# Patient Record
Sex: FEMALE | Race: WHITE
[De-identification: names, ages, dates, MRNs, and addresses within clinical notes are randomized per-mention and may not be internally consistent; named-entity substitution may affect disease eponyms.]

---

## 2017-04-20 ENCOUNTER — HOSPITAL ENCOUNTER (EMERGENCY)
Dept: HOSPITAL 4 - SED | Age: 22
Discharge: HOME | End: 2017-04-20
Payer: MEDICAID

## 2017-04-20 VITALS — SYSTOLIC BLOOD PRESSURE: 151 MMHG

## 2017-04-20 VITALS — WEIGHT: 105 LBS | HEIGHT: 66 IN | BODY MASS INDEX: 16.88 KG/M2

## 2017-04-20 VITALS — SYSTOLIC BLOOD PRESSURE: 132 MMHG

## 2017-04-20 DIAGNOSIS — H61.22: Primary | ICD-10-CM

## 2017-04-20 DIAGNOSIS — R03.0: ICD-10-CM

## 2017-04-20 DIAGNOSIS — H60.92: ICD-10-CM

## 2017-04-20 DIAGNOSIS — F31.9: ICD-10-CM

## 2017-05-03 ENCOUNTER — HOSPITAL ENCOUNTER (EMERGENCY)
Dept: HOSPITAL 4 - SED | Age: 22
Discharge: HOME | End: 2017-05-03
Payer: MEDICAID

## 2017-05-03 VITALS — BODY MASS INDEX: 16.88 KG/M2 | SYSTOLIC BLOOD PRESSURE: 126 MMHG | HEIGHT: 66 IN | WEIGHT: 105 LBS

## 2017-05-03 VITALS — SYSTOLIC BLOOD PRESSURE: 126 MMHG

## 2017-05-03 DIAGNOSIS — F31.9: ICD-10-CM

## 2017-05-03 DIAGNOSIS — B35.2: Primary | ICD-10-CM

## 2017-10-07 ENCOUNTER — HOSPITAL ENCOUNTER (EMERGENCY)
Dept: HOSPITAL 4 - SED | Age: 22
Discharge: HOME | End: 2017-10-07
Payer: MEDICAID

## 2017-10-07 VITALS — HEIGHT: 66 IN | WEIGHT: 110 LBS | BODY MASS INDEX: 17.68 KG/M2

## 2017-10-07 VITALS
HEART RATE: 79 BPM | DIASTOLIC BLOOD PRESSURE: 74 MMHG | SYSTOLIC BLOOD PRESSURE: 113 MMHG | RESPIRATION RATE: 16 BRPM | OXYGEN SATURATION: 98 % | TEMPERATURE: 96.8 F

## 2017-10-07 VITALS
OXYGEN SATURATION: 99 % | DIASTOLIC BLOOD PRESSURE: 71 MMHG | HEART RATE: 84 BPM | TEMPERATURE: 98.4 F | SYSTOLIC BLOOD PRESSURE: 128 MMHG | RESPIRATION RATE: 20 BRPM
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
## 2017-10-07 DIAGNOSIS — F31.9: ICD-10-CM
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
## 2017-10-07 DIAGNOSIS — S46.811A: Primary | ICD-10-CM
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
## 2017-10-07 LAB — HCG UR QL: NEGATIVE

## 2017-10-07 NOTE — NUR
Pt states she is having right postieror shoulder pain for 1 wk. She does not 
remember injuring her right shoulder.

## 2017-10-07 NOTE — NUR
Patient given written and verbal discharge instructions and verbalizes 
understanding.  ER MD discussed with patient the results and treatment 
provided. Patient in stable condition. ID arm band removed. Patient educated on 
pain management and to follow up with PMD. Pain Scale 7/10. Opportunity for 
questions provided and answered.

## 2017-12-27 ENCOUNTER — HOSPITAL ENCOUNTER (EMERGENCY)
Dept: HOSPITAL 4 - SED | Age: 22
Discharge: HOME | End: 2017-12-27
Payer: MEDICAID

## 2017-12-27 VITALS — BODY MASS INDEX: 17.68 KG/M2 | HEIGHT: 66 IN | WEIGHT: 110 LBS

## 2017-12-27 VITALS — SYSTOLIC BLOOD PRESSURE: 116 MMHG

## 2017-12-27 VITALS — SYSTOLIC BLOOD PRESSURE: 115 MMHG

## 2017-12-27 DIAGNOSIS — N83.209: Primary | ICD-10-CM

## 2017-12-27 DIAGNOSIS — F31.9: ICD-10-CM

## 2017-12-27 LAB
ALBUMIN SERPL BCP-MCNC: 3.8 G/DL (ref 3.4–4.8)
ALT SERPL W P-5'-P-CCNC: 63 U/L (ref 12–78)
ANION GAP SERPL CALCULATED.3IONS-SCNC: 8 MMOL/L (ref 5–15)
APPEARANCE UR: (no result)
AST SERPL W P-5'-P-CCNC: 46 U/L (ref 10–37)
BASOPHILS # BLD AUTO: 0 K/UL (ref 0–0.2)
BASOPHILS NFR BLD AUTO: 0.8 % (ref 0–2)
BILIRUB SERPL-MCNC: 0.3 MG/DL (ref 0–1)
BILIRUB UR QL STRIP: NEGATIVE
BUN SERPL-MCNC: 9 MG/DL (ref 8–21)
CALCIUM SERPL-MCNC: 9.1 MG/DL (ref 8.4–11)
CHLORIDE SERPL-SCNC: 103 MMOL/L (ref 98–107)
COLOR UR: YELLOW
CREAT SERPL-MCNC: 0.66 MG/DL (ref 0.55–1.3)
EOSINOPHIL # BLD AUTO: 0.1 K/UL (ref 0–0.4)
EOSINOPHIL NFR BLD AUTO: 2.4 % (ref 0–4)
ERYTHROCYTE [DISTWIDTH] IN BLOOD BY AUTOMATED COUNT: 12.9 % (ref 9–15)
GFR SERPL CREATININE-BSD FRML MDRD: 142 ML/MIN (ref 90–?)
GLUCOSE SERPL-MCNC: 90 MG/DL (ref 70–99)
GLUCOSE UR STRIP-MCNC: NEGATIVE MG/DL
HCT VFR BLD AUTO: 35.4 % (ref 36–48)
HGB BLD-MCNC: 11.8 G/DL (ref 12–16)
HGB UR QL STRIP: NEGATIVE
KETONES UR STRIP-MCNC: NEGATIVE MG/DL
LEUKOCYTE ESTERASE UR QL STRIP: NEGATIVE
LIPASE SERPL-CCNC: 148 U/L (ref 73–393)
LYMPHOCYTES # BLD AUTO: 1.6 K/UL (ref 1–5.5)
LYMPHOCYTES NFR BLD AUTO: 39.8 % (ref 20.5–51.5)
MCH RBC QN AUTO: 31 PG (ref 27–31)
MCHC RBC AUTO-ENTMCNC: 33 % (ref 32–36)
MCV RBC AUTO: 92 FL (ref 79–98)
MONOCYTES # BLD MANUAL: 0.5 K/UL (ref 0–1)
MONOCYTES # BLD MANUAL: 12.2 % (ref 1.7–9.3)
NEUTROPHILS # BLD AUTO: 1.9 K/UL (ref 1.8–7.7)
NEUTROPHILS NFR BLD AUTO: 44.8 % (ref 40–70)
NITRITE UR QL STRIP: NEGATIVE
PH UR STRIP: 7 [PH] (ref 5–8)
PLATELET # BLD AUTO: 142 K/UL (ref 130–430)
POTASSIUM SERPL-SCNC: 4 MMOL/L (ref 3.5–5.1)
PROT UR QL STRIP: NEGATIVE
RBC # BLD AUTO: 3.85 MIL/UL (ref 4.2–6.2)
SODIUM SERPLBLD-SCNC: 136 MMOL/L (ref 136–145)
SP GR UR STRIP: 1.01 (ref 1–1.03)
UROBILINOGEN UR STRIP-MCNC: 0.2 MG/DL (ref 0.2–1)
WBC # BLD AUTO: 4.1 K/UL (ref 4.8–10.8)

## 2017-12-27 PROCEDURE — 99285 EMERGENCY DEPT VISIT HI MDM: CPT

## 2017-12-27 PROCEDURE — 96375 TX/PRO/DX INJ NEW DRUG ADDON: CPT

## 2017-12-27 PROCEDURE — 96361 HYDRATE IV INFUSION ADD-ON: CPT

## 2017-12-27 PROCEDURE — 80053 COMPREHEN METABOLIC PANEL: CPT

## 2017-12-27 PROCEDURE — 83690 ASSAY OF LIPASE: CPT

## 2017-12-27 PROCEDURE — 81003 URINALYSIS AUTO W/O SCOPE: CPT

## 2017-12-27 PROCEDURE — 36415 COLL VENOUS BLD VENIPUNCTURE: CPT

## 2017-12-27 PROCEDURE — 85025 COMPLETE CBC W/AUTO DIFF WBC: CPT

## 2017-12-27 PROCEDURE — 96374 THER/PROPH/DIAG INJ IV PUSH: CPT

## 2017-12-27 PROCEDURE — 81025 URINE PREGNANCY TEST: CPT

## 2017-12-27 PROCEDURE — 74176 CT ABD & PELVIS W/O CONTRAST: CPT

## 2018-07-19 ENCOUNTER — HOSPITAL ENCOUNTER (EMERGENCY)
Dept: HOSPITAL 4 - SED | Age: 23
Discharge: HOME | End: 2018-07-19
Payer: MEDICAID

## 2018-07-19 VITALS — WEIGHT: 120 LBS | BODY MASS INDEX: 19.29 KG/M2 | HEIGHT: 66 IN | SYSTOLIC BLOOD PRESSURE: 123 MMHG

## 2018-07-19 VITALS — SYSTOLIC BLOOD PRESSURE: 122 MMHG

## 2018-07-19 DIAGNOSIS — F31.9: ICD-10-CM

## 2018-07-19 DIAGNOSIS — N83.01: ICD-10-CM

## 2018-07-19 DIAGNOSIS — R03.0: ICD-10-CM

## 2018-07-19 DIAGNOSIS — Z79.899: ICD-10-CM

## 2018-07-19 DIAGNOSIS — R10.32: ICD-10-CM

## 2018-07-19 DIAGNOSIS — N83.02: Primary | ICD-10-CM

## 2018-07-19 LAB
ALBUMIN SERPL BCP-MCNC: 3.8 G/DL (ref 3.4–4.8)
ALT SERPL W P-5'-P-CCNC: 15 U/L (ref 12–78)
ANION GAP SERPL CALCULATED.3IONS-SCNC: 11 MMOL/L (ref 5–15)
APPEARANCE UR: CLEAR
AST SERPL W P-5'-P-CCNC: 13 U/L (ref 10–37)
BASOPHILS # BLD AUTO: 0.1 K/UL (ref 0–0.2)
BASOPHILS NFR BLD AUTO: 1.3 % (ref 0–2)
BILIRUB SERPL-MCNC: 0.7 MG/DL (ref 0–1)
BILIRUB UR QL STRIP: NEGATIVE
BUN SERPL-MCNC: 8 MG/DL (ref 8–21)
CALCIUM SERPL-MCNC: 8.9 MG/DL (ref 8.4–11)
CHLORIDE SERPL-SCNC: 106 MMOL/L (ref 98–107)
COLOR UR: YELLOW
CREAT SERPL-MCNC: 0.84 MG/DL (ref 0.55–1.3)
EOSINOPHIL # BLD AUTO: 0.1 K/UL (ref 0–0.4)
EOSINOPHIL NFR BLD AUTO: 2.7 % (ref 0–4)
ERYTHROCYTE [DISTWIDTH] IN BLOOD BY AUTOMATED COUNT: 12.9 % (ref 9–15)
GFR SERPL CREATININE-BSD FRML MDRD: 106 ML/MIN (ref 90–?)
GLUCOSE SERPL-MCNC: 88 MG/DL (ref 70–99)
GLUCOSE UR STRIP-MCNC: NEGATIVE MG/DL
HCT VFR BLD AUTO: 37.2 % (ref 36–48)
HGB BLD-MCNC: 12.4 G/DL (ref 12–16)
HGB UR QL STRIP: NEGATIVE
KETONES UR STRIP-MCNC: NEGATIVE MG/DL
LEUKOCYTE ESTERASE UR QL STRIP: NEGATIVE
LIPASE SERPL-CCNC: 152 U/L (ref 73–393)
LYMPHOCYTES # BLD AUTO: 1.5 K/UL (ref 1–5.5)
LYMPHOCYTES NFR BLD AUTO: 28 % (ref 20.5–51.5)
MCH RBC QN AUTO: 31 PG (ref 27–31)
MCHC RBC AUTO-ENTMCNC: 33 % (ref 32–36)
MCV RBC AUTO: 92 FL (ref 79–98)
MONOCYTES # BLD MANUAL: 0.5 K/UL (ref 0–1)
MONOCYTES # BLD MANUAL: 9.8 % (ref 1.7–9.3)
NEUTROPHILS # BLD AUTO: 3.2 K/UL (ref 1.8–7.7)
NEUTROPHILS NFR BLD AUTO: 58.2 % (ref 40–70)
NITRITE UR QL STRIP: NEGATIVE
PH UR STRIP: 6 [PH] (ref 5–8)
PLATELET # BLD AUTO: 189 K/UL (ref 130–430)
POTASSIUM SERPL-SCNC: 3.7 MMOL/L (ref 3.5–5.1)
PROT UR QL STRIP: NEGATIVE
RBC # BLD AUTO: 4.06 MIL/UL (ref 4.2–6.2)
SODIUM SERPLBLD-SCNC: 141 MMOL/L (ref 136–145)
SP GR UR STRIP: 1.02 (ref 1–1.03)
UROBILINOGEN UR STRIP-MCNC: 0.2 MG/DL (ref 0.2–1)
WBC # BLD AUTO: 5.4 K/UL (ref 4.8–10.8)

## 2018-11-16 ENCOUNTER — HOSPITAL ENCOUNTER (EMERGENCY)
Dept: HOSPITAL 4 - SED | Age: 23
Discharge: HOME | End: 2018-11-16
Payer: MEDICAID

## 2018-11-16 VITALS — BODY MASS INDEX: 17.68 KG/M2 | WEIGHT: 110 LBS | HEIGHT: 66 IN

## 2018-11-16 VITALS — SYSTOLIC BLOOD PRESSURE: 110 MMHG

## 2018-11-16 VITALS — SYSTOLIC BLOOD PRESSURE: 126 MMHG
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
## 2018-11-16 DIAGNOSIS — S60.222A: Primary | ICD-10-CM

## 2018-11-16 DIAGNOSIS — R03.0: ICD-10-CM
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
## 2018-11-16 DIAGNOSIS — Z79.899: ICD-10-CM
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
## 2018-11-16 PROCEDURE — 99284 EMERGENCY DEPT VISIT MOD MDM: CPT

## 2018-11-16 PROCEDURE — 73130 X-RAY EXAM OF HAND: CPT

## 2018-11-16 PROCEDURE — 96372 THER/PROPH/DIAG INJ SC/IM: CPT

## 2019-02-15 ENCOUNTER — HOSPITAL ENCOUNTER (EMERGENCY)
Dept: HOSPITAL 4 - SED | Age: 24
Discharge: HOME | End: 2019-02-15
Payer: MEDICAID

## 2019-02-15 VITALS — WEIGHT: 110 LBS | HEIGHT: 66 IN | BODY MASS INDEX: 17.68 KG/M2

## 2019-02-15 VITALS — SYSTOLIC BLOOD PRESSURE: 127 MMHG

## 2019-02-15 DIAGNOSIS — M54.2: Primary | ICD-10-CM

## 2019-08-03 ENCOUNTER — HOSPITAL ENCOUNTER (EMERGENCY)
Dept: HOSPITAL 4 - SED | Age: 24
Discharge: HOME | End: 2019-08-03
Payer: MEDICAID

## 2019-08-03 VITALS — HEIGHT: 65 IN | BODY MASS INDEX: 17.83 KG/M2 | WEIGHT: 107 LBS

## 2019-08-03 VITALS — SYSTOLIC BLOOD PRESSURE: 123 MMHG

## 2019-08-03 VITALS — SYSTOLIC BLOOD PRESSURE: 141 MMHG

## 2019-08-03 DIAGNOSIS — M77.8: Primary | ICD-10-CM

## 2019-08-03 DIAGNOSIS — F31.9: ICD-10-CM

## 2019-08-03 LAB
ALBUMIN SERPL BCP-MCNC: 4.2 G/DL (ref 3.4–4.8)
ALT SERPL W P-5'-P-CCNC: 12 U/L (ref 12–78)
ANION GAP SERPL CALCULATED.3IONS-SCNC: 7 MMOL/L (ref 5–15)
AST SERPL W P-5'-P-CCNC: 16 U/L (ref 10–37)
BASOPHILS # BLD AUTO: 0 K/UL (ref 0–0.2)
BASOPHILS NFR BLD AUTO: 0.5 % (ref 0–2)
BILIRUB SERPL-MCNC: 0.8 MG/DL (ref 0–1)
BUN SERPL-MCNC: 12 MG/DL (ref 8–21)
CALCIUM SERPL-MCNC: 9.9 MG/DL (ref 8.4–11)
CHLORIDE SERPL-SCNC: 102 MMOL/L (ref 98–107)
CREAT SERPL-MCNC: 0.65 MG/DL (ref 0.55–1.3)
CRP SERPL-MCNC: < 0.2 MG/DL (ref 0–0.5)
EOSINOPHIL # BLD AUTO: 0.1 K/UL (ref 0–0.4)
EOSINOPHIL NFR BLD AUTO: 2.2 % (ref 0–4)
ERYTHROCYTE [DISTWIDTH] IN BLOOD BY AUTOMATED COUNT: 14.3 % (ref 9–15)
GFR SERPL CREATININE-BSD FRML MDRD: 142 ML/MIN (ref 90–?)
GLUCOSE SERPL-MCNC: 94 MG/DL (ref 70–99)
HCT VFR BLD AUTO: 39.5 % (ref 36–48)
HGB BLD-MCNC: 13.4 G/DL (ref 12–16)
INR PPP: 0.9 (ref 0.8–1.2)
LYMPHOCYTES # BLD AUTO: 1.4 K/UL (ref 1–5.5)
LYMPHOCYTES NFR BLD AUTO: 22.8 % (ref 20.5–51.5)
MCH RBC QN AUTO: 32 PG (ref 27–31)
MCHC RBC AUTO-ENTMCNC: 34 % (ref 32–36)
MCV RBC AUTO: 94 FL (ref 79–98)
MONOCYTES # BLD MANUAL: 0.6 K/UL (ref 0–1)
MONOCYTES # BLD MANUAL: 9.6 % (ref 1.7–9.3)
NEUTROPHILS # BLD AUTO: 4.1 K/UL (ref 1.8–7.7)
NEUTROPHILS NFR BLD AUTO: 64.9 % (ref 40–70)
PLATELET # BLD AUTO: 178 K/UL (ref 130–430)
POTASSIUM SERPL-SCNC: 4.3 MMOL/L (ref 3.5–5.1)
PROTHROMBIN TIME: 9.4 SECS (ref 9.5–12.5)
RBC # BLD AUTO: 4.21 MIL/UL (ref 4.2–6.2)
SODIUM SERPLBLD-SCNC: 137 MMOL/L (ref 136–145)
URATE UR-MCNC: 3.3 MG/DL (ref 2.4–7)
WBC # BLD AUTO: 6.4 K/UL (ref 4.8–10.8)

## 2019-08-29 ENCOUNTER — HOSPITAL ENCOUNTER (EMERGENCY)
Dept: HOSPITAL 4 - SED | Age: 24
Discharge: HOME | End: 2019-08-29
Payer: MEDICAID

## 2019-08-29 VITALS — SYSTOLIC BLOOD PRESSURE: 129 MMHG | BODY MASS INDEX: 17.36 KG/M2 | WEIGHT: 108 LBS | HEIGHT: 66 IN

## 2019-08-29 VITALS — SYSTOLIC BLOOD PRESSURE: 125 MMHG

## 2019-08-29 DIAGNOSIS — Z79.899: ICD-10-CM

## 2019-08-29 DIAGNOSIS — K59.00: Primary | ICD-10-CM

## 2019-08-29 DIAGNOSIS — F31.9: ICD-10-CM

## 2019-08-29 LAB
ALBUMIN SERPL BCP-MCNC: 4.1 G/DL (ref 3.4–4.8)
ALT SERPL W P-5'-P-CCNC: 14 U/L (ref 12–78)
ANION GAP SERPL CALCULATED.3IONS-SCNC: 9 MMOL/L (ref 5–15)
APPEARANCE UR: CLEAR
AST SERPL W P-5'-P-CCNC: 15 U/L (ref 10–37)
BASOPHILS # BLD AUTO: 0 K/UL (ref 0–0.2)
BASOPHILS NFR BLD AUTO: 0.5 % (ref 0–2)
BILIRUB SERPL-MCNC: 1.4 MG/DL (ref 0–1)
BILIRUB UR QL STRIP: NEGATIVE
BUN SERPL-MCNC: 13 MG/DL (ref 8–21)
CALCIUM SERPL-MCNC: 9.3 MG/DL (ref 8.4–11)
CHLORIDE SERPL-SCNC: 106 MMOL/L (ref 98–107)
COLOR UR: YELLOW
CREAT SERPL-MCNC: 0.7 MG/DL (ref 0.55–1.3)
EOSINOPHIL # BLD AUTO: 0.1 K/UL (ref 0–0.4)
EOSINOPHIL NFR BLD AUTO: 2.5 % (ref 0–4)
ERYTHROCYTE [DISTWIDTH] IN BLOOD BY AUTOMATED COUNT: 13.3 % (ref 9–15)
GFR SERPL CREATININE-BSD FRML MDRD: 130 ML/MIN (ref 90–?)
GLUCOSE SERPL-MCNC: 89 MG/DL (ref 70–99)
GLUCOSE UR STRIP-MCNC: NEGATIVE MG/DL
HCT VFR BLD AUTO: 39.2 % (ref 36–48)
HGB BLD-MCNC: 13.3 G/DL (ref 12–16)
HGB UR QL STRIP: NEGATIVE
KETONES UR STRIP-MCNC: (no result) MG/DL
LEUKOCYTE ESTERASE UR QL STRIP: NEGATIVE
LIPASE SERPL-CCNC: 138 U/L (ref 73–393)
LYMPHOCYTES # BLD AUTO: 1.3 K/UL (ref 1–5.5)
LYMPHOCYTES NFR BLD AUTO: 24.4 % (ref 20.5–51.5)
MCH RBC QN AUTO: 33 PG (ref 27–31)
MCHC RBC AUTO-ENTMCNC: 34 % (ref 32–36)
MCV RBC AUTO: 96 FL (ref 79–98)
MONOCYTES # BLD MANUAL: 0.5 K/UL (ref 0–1)
MONOCYTES # BLD MANUAL: 9.4 % (ref 1.7–9.3)
NEUTROPHILS # BLD AUTO: 3.4 K/UL (ref 1.8–7.7)
NEUTROPHILS NFR BLD AUTO: 63.2 % (ref 40–70)
NITRITE UR QL STRIP: NEGATIVE
PH UR STRIP: 6 [PH] (ref 5–8)
PLATELET # BLD AUTO: 186 K/UL (ref 130–430)
POTASSIUM SERPL-SCNC: 4.4 MMOL/L (ref 3.5–5.1)
PROT UR QL STRIP: NEGATIVE
RBC # BLD AUTO: 4.08 MIL/UL (ref 4.2–6.2)
SODIUM SERPLBLD-SCNC: 142 MMOL/L (ref 136–145)
SP GR UR STRIP: 1.02 (ref 1–1.03)
UROBILINOGEN UR STRIP-MCNC: 0.2 MG/DL (ref 0.2–1)
WBC # BLD AUTO: 5.3 K/UL (ref 4.8–10.8)